# Patient Record
Sex: FEMALE | Race: BLACK OR AFRICAN AMERICAN | NOT HISPANIC OR LATINO | URBAN - METROPOLITAN AREA
[De-identification: names, ages, dates, MRNs, and addresses within clinical notes are randomized per-mention and may not be internally consistent; named-entity substitution may affect disease eponyms.]

---

## 2024-01-23 ENCOUNTER — INPATIENT (INPATIENT)
Facility: HOSPITAL | Age: 69
LOS: 0 days | Discharge: HOME | End: 2024-01-24
Attending: HOSPITALIST | Admitting: HOSPITALIST
Payer: SELF-PAY

## 2024-01-23 VITALS
HEIGHT: 68 IN | DIASTOLIC BLOOD PRESSURE: 85 MMHG | RESPIRATION RATE: 15 BRPM | WEIGHT: 195.11 LBS | HEART RATE: 75 BPM | SYSTOLIC BLOOD PRESSURE: 117 MMHG | OXYGEN SATURATION: 97 % | TEMPERATURE: 98 F

## 2024-01-23 LAB
ALBUMIN SERPL ELPH-MCNC: 3.2 G/DL — LOW (ref 3.3–5)
ALP SERPL-CCNC: 78 U/L — SIGNIFICANT CHANGE UP (ref 40–120)
ALT FLD-CCNC: 19 U/L — SIGNIFICANT CHANGE UP (ref 12–78)
ANION GAP SERPL CALC-SCNC: 3 MMOL/L — LOW (ref 5–17)
AST SERPL-CCNC: 15 U/L — SIGNIFICANT CHANGE UP (ref 15–37)
BASE EXCESS BLDV CALC-SCNC: 6 MMOL/L — HIGH (ref -2–3)
BASOPHILS # BLD AUTO: 0.08 K/UL — SIGNIFICANT CHANGE UP (ref 0–0.2)
BASOPHILS NFR BLD AUTO: 1.1 % — SIGNIFICANT CHANGE UP (ref 0–2)
BILIRUB SERPL-MCNC: 0.3 MG/DL — SIGNIFICANT CHANGE UP (ref 0.2–1.2)
BLOOD GAS COMMENTS, VENOUS: SIGNIFICANT CHANGE UP
BUN SERPL-MCNC: 10 MG/DL — SIGNIFICANT CHANGE UP (ref 7–23)
CALCIUM SERPL-MCNC: 8.8 MG/DL — SIGNIFICANT CHANGE UP (ref 8.5–10.1)
CHLORIDE BLDV-SCNC: 103 MMOL/L — SIGNIFICANT CHANGE UP (ref 98–107)
CHLORIDE SERPL-SCNC: 109 MMOL/L — HIGH (ref 96–108)
CO2 BLDV-SCNC: 34 MMOL/L — HIGH (ref 22–26)
CO2 SERPL-SCNC: 27 MMOL/L — SIGNIFICANT CHANGE UP (ref 22–31)
CREAT SERPL-MCNC: 0.87 MG/DL — SIGNIFICANT CHANGE UP (ref 0.5–1.3)
EGFR: 72 ML/MIN/1.73M2 — SIGNIFICANT CHANGE UP
EOSINOPHIL # BLD AUTO: 0.28 K/UL — SIGNIFICANT CHANGE UP (ref 0–0.5)
EOSINOPHIL NFR BLD AUTO: 3.9 % — SIGNIFICANT CHANGE UP (ref 0–6)
GAS PNL BLDV: 137 MMOL/L — SIGNIFICANT CHANGE UP (ref 136–145)
GAS PNL BLDV: SIGNIFICANT CHANGE UP
GAS PNL BLDV: SIGNIFICANT CHANGE UP
GLUCOSE BLDV-MCNC: 110 MG/DL — HIGH (ref 65–95)
GLUCOSE SERPL-MCNC: 104 MG/DL — HIGH (ref 70–99)
HCO3 BLDV-SCNC: 32 MMOL/L — HIGH (ref 22–28)
HCT VFR BLD CALC: 42.2 % — SIGNIFICANT CHANGE UP (ref 34.5–45)
HCT VFR BLDA CALC: 41 % — SIGNIFICANT CHANGE UP (ref 37–47)
HGB BLD CALC-MCNC: 13.5 G/DL — SIGNIFICANT CHANGE UP (ref 11.7–16.1)
HGB BLD-MCNC: 13.1 G/DL — SIGNIFICANT CHANGE UP (ref 11.5–15.5)
HOROWITZ INDEX BLDV+IHG-RTO: 21 — SIGNIFICANT CHANGE UP
IMM GRANULOCYTES NFR BLD AUTO: 0.1 % — SIGNIFICANT CHANGE UP (ref 0–0.9)
LACTATE BLDV-MCNC: 1 MMOL/L — SIGNIFICANT CHANGE UP (ref 0.56–1.39)
LYMPHOCYTES # BLD AUTO: 3.9 K/UL — HIGH (ref 1–3.3)
LYMPHOCYTES # BLD AUTO: 53.8 % — HIGH (ref 13–44)
MCHC RBC-ENTMCNC: 24 PG — LOW (ref 27–34)
MCHC RBC-ENTMCNC: 31 G/DL — LOW (ref 32–36)
MCV RBC AUTO: 77.3 FL — LOW (ref 80–100)
MONOCYTES # BLD AUTO: 0.55 K/UL — SIGNIFICANT CHANGE UP (ref 0–0.9)
MONOCYTES NFR BLD AUTO: 7.6 % — SIGNIFICANT CHANGE UP (ref 2–14)
NEUTROPHILS # BLD AUTO: 2.43 K/UL — SIGNIFICANT CHANGE UP (ref 1.8–7.4)
NEUTROPHILS NFR BLD AUTO: 33.5 % — LOW (ref 43–77)
NRBC # BLD: 0 /100 WBCS — SIGNIFICANT CHANGE UP (ref 0–0)
NT-PROBNP SERPL-SCNC: 20 PG/ML — SIGNIFICANT CHANGE UP (ref 0–125)
PCO2 BLDV: 52 MMHG — SIGNIFICANT CHANGE UP (ref 42–55)
PH BLDV: 7.4 — SIGNIFICANT CHANGE UP (ref 7.32–7.43)
PLATELET # BLD AUTO: 243 K/UL — SIGNIFICANT CHANGE UP (ref 150–400)
PO2 BLDV: 28 MMHG — SIGNIFICANT CHANGE UP (ref 25–45)
POTASSIUM BLDV-SCNC: 4.4 MMOL/L — SIGNIFICANT CHANGE UP (ref 3.5–5.1)
POTASSIUM SERPL-MCNC: 4.2 MMOL/L — SIGNIFICANT CHANGE UP (ref 3.5–5.3)
POTASSIUM SERPL-SCNC: 4.2 MMOL/L — SIGNIFICANT CHANGE UP (ref 3.5–5.3)
PROT SERPL-MCNC: 7.3 GM/DL — SIGNIFICANT CHANGE UP (ref 6–8.3)
RBC # BLD: 5.46 M/UL — HIGH (ref 3.8–5.2)
RBC # FLD: 15 % — HIGH (ref 10.3–14.5)
SAO2 % BLDV: 35.1 % — LOW (ref 94–98)
SODIUM SERPL-SCNC: 139 MMOL/L — SIGNIFICANT CHANGE UP (ref 135–145)
TROPONIN I, HIGH SENSITIVITY RESULT: 3.8 NG/L — SIGNIFICANT CHANGE UP
TSH SERPL-MCNC: 1.19 UIU/ML — SIGNIFICANT CHANGE UP (ref 0.36–3.74)
WBC # BLD: 7.25 K/UL — SIGNIFICANT CHANGE UP (ref 3.8–10.5)
WBC # FLD AUTO: 7.25 K/UL — SIGNIFICANT CHANGE UP (ref 3.8–10.5)

## 2024-01-23 PROCEDURE — 71045 X-RAY EXAM CHEST 1 VIEW: CPT | Mod: 26

## 2024-01-23 PROCEDURE — 99285 EMERGENCY DEPT VISIT HI MDM: CPT

## 2024-01-23 PROCEDURE — 93010 ELECTROCARDIOGRAM REPORT: CPT

## 2024-01-23 RX ORDER — SODIUM CHLORIDE 9 MG/ML
1000 INJECTION, SOLUTION INTRAVENOUS ONCE
Refills: 0 | Status: COMPLETED | OUTPATIENT
Start: 2024-01-23 | End: 2024-01-23

## 2024-01-23 RX ADMIN — SODIUM CHLORIDE 1000 MILLILITER(S): 9 INJECTION, SOLUTION INTRAVENOUS at 23:39

## 2024-01-23 RX ADMIN — SODIUM CHLORIDE 1000 MILLILITER(S): 9 INJECTION, SOLUTION INTRAVENOUS at 22:28

## 2024-01-23 NOTE — ED ADULT NURSE NOTE - ED STAT RN HANDOFF DETAILS
Report given to COURTNEY sprague on 2d. Rn made aware of pt pmh and hpi, lab and imagign results, IV access, medications given, and POC. VSS, nad noted. Pt A&Ox4.

## 2024-01-23 NOTE — ED PROVIDER NOTE - PHYSICAL EXAMINATION
General: Well appearing, well nourished, in no distress  Head: Normocephalic, atraumatic  Eyes: Conjunctiva clear, sclera non-icteric, EOM intact, PERRL  Mouth: Mucous membranes moist, no mucosal lesions.  Neck: Supple  Heart: Regular rate and rhythm, no murmur or gallop  Lungs: Clear to auscultation  Abdomen: Bowel sounds present, soft, no tenderness, non distended, no organomegaly, masses  Back: Spine normal without deformity or tenderness, no CVA tenderness  Extremities: No amputations or deformities, cyanosis, edema  Musculoskeletal: No crepitation, defects or decreased range of motion, strength intact throughout, pulses intact  Neurologic: No gross deficits CN II-XII intact Sensation intact No dysmetria   Psychiatric: Oriented X3, normal mood and affect  Skin: Warm,dry. Good turgor, no rash, unusual bruising, Cap refill <2 seconds

## 2024-01-23 NOTE — ED ADULT TRIAGE NOTE - CHIEF COMPLAINT QUOTE
witnessed syncope by son in law, states she passed out on the bed, patient complaining of left arm heaviness, right arm pain, dizziness, nausea, headache

## 2024-01-23 NOTE — ED PROVIDER NOTE - CLINICAL SUMMARY MEDICAL DECISION MAKING FREE TEXT BOX
Patient did have recent travel however patient had the symptoms prior to travel.  With her history of hypertension and chest pain we will pursue cardiac workup.  Chest x-ray does not reveal any overt abnormalities.  EKG normal sinus rhythm no QTc prolongation x 2 there is some T wave flattening do not have anything for comparison repeated x 2 with no dynamic change. Patient did have recent travel however patient had these symptoms prior to travel.  With her history of hypertension and chest pain we will pursue cardiac workup.  Chest x-ray does not reveal any overt abnormalities.  EKG normal sinus rhythm no QTc prolongation x 2 there is some T wave flattening do not have anything for comparison repeated x 2 with no dynamic change. Pt states intermittent headache however neuro intact at this time and headache is resolved completely. Will admit for cardiac workup and further evaluation if cardiac workup neg may need further eval inpatient ( neuro) for recurrent syncope.

## 2024-01-23 NOTE — ED PROVIDER NOTE - PROGRESS NOTE DETAILS
trop neg x 2 however with fatigue recurrent syncope will require further cardiac workup TBA trop neg x 2 however with fatigue recurrent syncope will require further cardiac workup TBA  signed out to hosp still pending dimer result  pt states headache is completely resolved at this time.

## 2024-01-23 NOTE — ED ADULT NURSE NOTE - SIGNIFICANT NEGATIVE FINDINGS
denies cp, sob, numbness, tingling, pain, unilateral weakness, cough, sore throat, dysuria, hematuria, n/v/d, HA, dizziness, lightheadedness, palpitations, vision changes, pain

## 2024-01-23 NOTE — ED ADULT NURSE NOTE - NSFALLHARMRISKINTERV_ED_ALL_ED
Assistance OOB with selected safe patient handling equipment if applicable/Communicate risk of Fall with Harm to all staff, patient, and family/Orthostatic vital signs/Provide visual cue: red socks, yellow wristband, yellow gown, etc/Reinforce activity limits and safety measures with patient and family/Bed in lowest position, wheels locked, appropriate side rails in place/Call bell, personal items and telephone in reach/Instruct patient to call for assistance before getting out of bed/chair/stretcher/Non-slip footwear applied when patient is off stretcher/[place] to call system/Physically safe environment - no spills, clutter or unnecessary equipment/Purposeful Proactive Rounding/Room/bathroom lighting operational, light cord in reach

## 2024-01-23 NOTE — ED ADULT NURSE NOTE - OBJECTIVE STATEMENT
Pt A&Ox4 presents to ED for medical eval s/p syncopal episode. As per pt flew into [place] this week and has been feeling tired x 1 week. At around 1800 tonight pt had witnessed syncopal episode at home. As per family, no head strike or LOC, pt passed out on the bed. As per pt after episode pt had nausea, headache, L arm heaviness, and dizziness. Denies all symptoms at this time. Pt denies cp, sob, numbness, tingling, pain, unilateral weakness, cough, sore throat, dysuria, hematuria, n/v/d, HA, f/c, dizziness, lightheadedness, palpitations, vision changes, pain. Pt states "I just feel tired." PMH HTN. NKA. VSS, nad noted. EKG completed, pt placed on cardiac monitor. Pt A&Ox4 presents to ED for medical eval s/p syncopal episode. As per pt flew into [place] this week and has been feeling tired x 1 week. At around 1800 tonight pt had witnessed syncopal episode at home. As per family, no head strike or LOC, pt passed out on the bed. As per pt after episode pt had nausea, headache, L arm heaviness, and dizziness. Denies all symptoms at this time. Pt denies cp, sob, numbness, tingling, pain, unilateral weakness, cough, sore throat, dysuria, hematuria, n/v/d, HA, f/c, dizziness, lightheadedness, palpitations, vision changes, pain. Pt states "I just feel tired." PMH HTN. NKA. VSS, nad noted. EKG completed, pt placed on cardiac monitor. IV Access placed.

## 2024-01-23 NOTE — ED PROVIDER NOTE - OBJECTIVE STATEMENT
This is a 68-year-old female with a past medical history of hypertension.  Patient lives in [place] and is here visiting on vacation with her family.  Patient is a very poor historian.  Patient states she has been having intermittent blurriness and intermittent headaches that are not sudden in onset not the worst headache of her life and a generalized tiredness for the past few weeks.  Of note approximately 2 weeks ago patient was walking when she experienced a syncopal episode.  Patient states she did not follow-up for this.  Since she has been with her family she is very tired she is sleeping a lot and today she was complaining of left chest pain and a left arm heaviness.  No nausea no vomiting no diarrhea no fevers no cough cold symptoms.  Patient states that she has not had a recent stress test or echo patient states that in the past she has a history of pericarditis did not require any intervention.  Daughter at bedside states that she was not herself today and was less chatty but is now at her baseline.  Father had a history of blood clots. This is a 68-year-old female with a past medical history of hypertension.  Patient lives in [place] and is here visiting on vacation with her family.  Patient is a very poor historian.  Patient states she has been having intermittent blurriness and intermittent headaches that are not sudden in onset not the worst headache of her life and a generalized tiredness for the past few weeks. No current headache or vision issues ( wears glasses). Of note approximately 2 weeks ago patient was walking when she experienced a syncopal episode states she was dizzy at that time..  Patient states she did not follow-up for this.  Since she has been with her family she is very tired she is sleeping a lot and today she was complaining of left chest pain and a left arm heaviness. States felt like she was going to pass out then did immediately after. Brief and back to baseline shortly after. No nausea no vomiting no diarrhea no fevers no cough cold symptoms.  Patient states that she has not had a recent stress test or echo patient states that in the past she has a history of pericarditis did not require any intervention.  Daughter at bedside states that she was not herself today and was less chatty but is now at her baseline.  Father had a history of blood clots. This is a 68-year-old female with a past medical history of hypertension.  Patient lives in [place] and is here visiting on vacation with her family.  Patient is a very poor historian.  Patient states she has been having intermittent blurriness and intermittent headaches that are not sudden in onset not the worst headache of her life and a generalized tiredness for the past few weeks. States history of migraines states this headache has been like a cap" around her head not as bad as her migraines. No current headache or vision issues ( wears glasses).  States she did see opth and get new rx however still experiencing intermittent blurriness. Of note approximately 2 weeks ago patient was walking when she experienced a syncopal episode states she was dizzy at that timeStates she has been having L chest pain on and off for quite some time feels it worsens when ambulating.  Patient states she did not follow-up for this.  Since she has been with her family she is very tired she is sleeping a lot and today she was complaining of left chest pain and a left arm heaviness. States felt like she was going to pass out then did immediately after witnessed by son. Brief and back to baseline shortly after. No head trauma. No nausea no vomiting no diarrhea no fevers no cough cold symptoms.  Patient states that she has not had a recent stress test or echo patient states that in the past she has a history of pericarditis with effusion did not require any intervention.  Daughter at bedside states that she was not herself today and was less chatty but is now at her baseline.  Father had a history of blood clots. Brother had cranial surgery from fall/bleed family unable to clarify exact hx.

## 2024-01-24 VITALS
SYSTOLIC BLOOD PRESSURE: 126 MMHG | HEART RATE: 78 BPM | RESPIRATION RATE: 18 BRPM | TEMPERATURE: 99 F | DIASTOLIC BLOOD PRESSURE: 84 MMHG | OXYGEN SATURATION: 96 %

## 2024-01-24 DIAGNOSIS — R55 SYNCOPE AND COLLAPSE: ICD-10-CM

## 2024-01-24 LAB
RAPID RVP RESULT: SIGNIFICANT CHANGE UP
SARS-COV-2 RNA SPEC QL NAA+PROBE: SIGNIFICANT CHANGE UP
TROPONIN I, HIGH SENSITIVITY RESULT: 3.5 NG/L — SIGNIFICANT CHANGE UP

## 2024-01-24 PROCEDURE — 93306 TTE W/DOPPLER COMPLETE: CPT | Mod: 26

## 2024-01-24 PROCEDURE — 99236 HOSP IP/OBS SAME DATE HI 85: CPT

## 2024-01-24 PROCEDURE — 99223 1ST HOSP IP/OBS HIGH 75: CPT

## 2024-01-24 PROCEDURE — 93880 EXTRACRANIAL BILAT STUDY: CPT | Mod: 26

## 2024-01-24 RX ORDER — ACETAMINOPHEN 500 MG
650 TABLET ORAL EVERY 6 HOURS
Refills: 0 | Status: DISCONTINUED | OUTPATIENT
Start: 2024-01-24 | End: 2024-01-24

## 2024-01-24 RX ORDER — ONDANSETRON 8 MG/1
4 TABLET, FILM COATED ORAL EVERY 8 HOURS
Refills: 0 | Status: DISCONTINUED | OUTPATIENT
Start: 2024-01-24 | End: 2024-01-24

## 2024-01-24 RX ORDER — LANOLIN ALCOHOL/MO/W.PET/CERES
3 CREAM (GRAM) TOPICAL AT BEDTIME
Refills: 0 | Status: DISCONTINUED | OUTPATIENT
Start: 2024-01-24 | End: 2024-01-24

## 2024-01-24 RX ORDER — ENOXAPARIN SODIUM 100 MG/ML
40 INJECTION SUBCUTANEOUS EVERY 24 HOURS
Refills: 0 | Status: DISCONTINUED | OUTPATIENT
Start: 2024-01-24 | End: 2024-01-24

## 2024-01-24 NOTE — DISCHARGE NOTE PROVIDER - ATTENDING ATTESTATION STATEMENT
I have personally seen and examined the patient. I have collaborated with and supervised the Billing Type: Third-Party Bill

## 2024-01-24 NOTE — H&P ADULT - PROBLEM SELECTOR PLAN 1
Witnessed syncopal episode   - Tele  - Fall precaution   - Orthostatic vitals  - Neuro-checks Q4hrs   - Echo   - Carotid doppler   - Cardio consult   - DVT Prophylaxis   - F/u labs

## 2024-01-24 NOTE — DISCHARGE NOTE PROVIDER - HOSPITAL COURSE
HPI:  68-year-old female with a PMH of HTN, pericarditis  presents today for witnessed syncopal episode. Prior to episode experienced chest tightness, left arm heaviness with the feeling of "passing out", episode was brief and return to baseline upon waking up. As per daughter, patient appears less interactive today and has been sleeping more. Patient currently Visiting from [place], has been in [place] for 1 week, endorses she has been feeling tired since arrival. 2 weeks ago patient had a syncopal episode, while ambulating, felt dizzy then syncopized. Denies headache, vision changes, chest pain, palpitation, SOB, N/V/D or nay other acute symptoms. Vitals stable.    (24 Jan 2024 05:48)    patient has returned to normal   no acute events on tele   < from: TTE Echo Complete w/o Contrast w/ Doppler (01.24.24 @ 09:28) >    Summary:   1. Normal global left ventricular systolic function.   2. LVEF 55-60%.   3. Trivial pericardial effusion.      [id number] Alistair Kellogg  Electronically signed on 1/24/2024 at 10:55:08 AM    < end of copied text >  Antegrade flow is noted within both vertebral arteries.    IMPRESSION: No significant hemodynamic stenosis of either carotid artery.    Measurement of carotid stenosis is based on velocity parameters that   correlate the residual internal carotid diameter with that of the more   distal vessel in accordance with a method such as the [place] American   Symptomatic Carotid Endarterectomy Trial (NASCET).      patient is from [facility] advised to go home and follow up with her doctors but may return to the ED at anytime

## 2024-01-24 NOTE — H&P ADULT - NSHPLABSRESULTS_GEN_ALL_CORE
13.1   7.25  )-----------( 243      ( 23 Jan 2024 22:24 )             42.2     139  |  109<H>  |  10  ----------------------------<  104<H>     01-23  4.2   |  27  |  0.87    Ca    8.8      23 Jan 2024 22:24    TPro  7.3  /  Alb  3.2<L>  /  TBili  0.3  /  DBili  x   /  AST  15  /  ALT  19  /  AlkPhos  78  01-23        22:30 - VBG - pH: 7.40  | pCO2: 52    | pO2: 28    | Lactate: 1.00       Pro-BNP: 20 (01-23-24 @ 22:24)

## 2024-01-24 NOTE — DISCHARGE NOTE NURSING/CASE MANAGEMENT/SOCIAL WORK - NSDCPEFALRISK_GEN_ALL_CORE
For information on Fall & Injury Prevention, visit: [email or web address] OR  [email or web address] OR  https://www.cdc.gov/steadi/patient.html

## 2024-01-24 NOTE — ED CLERICAL - DIVISION
Patient evaluated after having EGD found with pastor's without dysplasia. Also had Breath test consistent with SIBO.  Today referred having intermittent diarrhea problems.  Denies nausea, vomits, dysphagia, odynophagia, heartburn, abdominal pain, constipation GI bleeding or weight loss [facility]... normal (ped)...

## 2024-01-24 NOTE — DISCHARGE NOTE PROVIDER - ATTENDING DISCHARGE PHYSICAL EXAMINATION:
GENERAL: NAD, well-groomed, well-developed  HEAD:  Atraumatic, Normocephalic  EYES: EOMI, PERRLA, conjunctiva and sclera clear  ENMT: No tonsillar erythema, exudates, or enlargement; Moist mucous membranes, Good dentition, No lesions  NECK: Supple, No JVD, Normal thyroid  NERVOUS SYSTEM:  Alert & Oriented X3, Good concentration; Motor Strength 5/5 B/L upper and lower extremities; DTRs 2+ intact and symmetric  CHEST/LUNG: Clear to percussion bilaterally; No rales, rhonchi, wheezing, or rubs  HEART: Regular rate and rhythm; No murmurs, rubs, or gallops  ABDOMEN: Soft, Nontender, Nondistended; Bowel sounds present  EXTREMITIES:  2+ Peripheral Pulses, No clubbing, cyanosis, or edema  LYMPH: No lymphadenopathy noted

## 2024-01-24 NOTE — DISCHARGE NOTE NURSING/CASE MANAGEMENT/SOCIAL WORK - PATIENT PORTAL LINK FT
You can access the FollowMyHealth Patient Portal offered by [facility] by registering at the following website: [email or web address]. By joining [employer]’s FollowMyHealth portal, you will also be able to view your health information using other applications (apps) compatible with our system.

## 2024-01-24 NOTE — DISCHARGE NOTE PROVIDER - DISCHARGE SERVICE FOR PATIENT
on the discharge service for the patient. I have reviewed and made amendments to the documentation where necessary.

## 2024-01-24 NOTE — DISCHARGE NOTE PROVIDER - TIME SPENT: (MINUTES SPENT ON THE DISCHARGE SERVICE)
Knee pain-chronic  XR in [place]  Ortho referral  Short term NSAID use reviewed, risks benefits    Pt will make appt with [place] doctor or PCP for chronic care follow up  Has sold home and moving to [place] FT soon      Pt agreeable to care plan  
75

## 2024-01-29 DIAGNOSIS — I10 ESSENTIAL (PRIMARY) HYPERTENSION: ICD-10-CM

## 2024-01-29 DIAGNOSIS — I31.9 DISEASE OF PERICARDIUM, UNSPECIFIED: ICD-10-CM

## 2024-01-29 DIAGNOSIS — R55 SYNCOPE AND COLLAPSE: ICD-10-CM

## 2024-02-03 NOTE — ED POST DISCHARGE NOTE - RESULT SUMMARY
Patient called [facility] ED, provided two patient identifiers, inquiring about test results.  Chart review shows that patient received care at [facility] for visit in question.  I instructed patient to call [facility] to discuss results.

## 2024-03-04 NOTE — ED ADULT NURSE NOTE - BREATH SOUNDS, RIGHT
Problem: Patient Centered Care  Goal: Patient preferences are identified and integrated in the patient's plan of care  Description: Interventions:  - What would you like us to know as we care for you?   - Provide timely, complete, and accurate information to patient/family  - Incorporate patient and family knowledge, values, beliefs, and cultural backgrounds into the planning and delivery of care  - Encourage patient/family to participate in care and decision-making at the level they choose  - Honor patient and family perspectives and choices  Outcome: Progressing     Problem: Diabetes/Glucose Control  Goal: Glucose maintained within prescribed range  Description: INTERVENTIONS:  - Monitor Blood Glucose as ordered  - Assess for signs and symptoms of hyperglycemia and hypoglycemia  - Administer ordered medications to maintain glucose within target range  - Assess barriers to adequate nutritional intake and initiate nutrition consult as needed  - Instruct patient on self management of diabetes  Outcome: Progressing     Problem: Patient/Family Goals  Goal: Patient/Family Long Term Goal  Description: Patient's Long Term Goal: Safe & free of falls!    Interventions:  - Fall risk precautions are followed at all times.  - Bed and chair alarm remains in use at all times.  - Call light within reach at all times.   - See additional Care Plan goals for specific interventions  Outcome: Progressing  Goal: Patient/Family Short Term Goal  Description: Patient's Short Term Goal: Pain relief!    Interventions:   - Pain level is assess using pain scale from 1 to 10.  - See additional Care Plan goals for specific interventions  Outcome: Progressing     Problem: PAIN - ADULT  Goal: Verbalizes/displays adequate comfort level or patient's stated pain goal  Description: INTERVENTIONS:  - Encourage pt to monitor pain and request assistance  - Assess pain using appropriate pain scale  - Administer analgesics based on type and severity of  pain and evaluate response  - Implement non-pharmacological measures as appropriate and evaluate response  - Consider cultural and social influences on pain and pain management  - Manage/alleviate anxiety  - Utilize distraction and/or relaxation techniques  - Monitor for opioid side effects  - Notify MD/LIP if interventions unsuccessful or patient reports new pain  - Anticipate increased pain with activity and pre-medicate as appropriate  Outcome: Progressing     Problem: RISK FOR INFECTION - ADULT  Goal: Absence of fever/infection during anticipated neutropenic period  Description: INTERVENTIONS  - Monitor WBC  - Administer growth factors as ordered  - Implement neutropenic guidelines  Outcome: Progressing     Problem: SAFETY ADULT - FALL  Goal: Free from fall injury  Description: INTERVENTIONS:  - Assess pt frequently for physical needs  - Identify cognitive and physical deficits and behaviors that affect risk of falls.  - Goodman fall precautions as indicated by assessment.  - Educate pt/family on patient safety including physical limitations  - Instruct pt to call for assistance with activity based on assessment  - Modify environment to reduce risk of injury  - Provide assistive devices as appropriate  - Consider OT/PT consult to assist with strengthening/mobility  - Encourage toileting schedule  Outcome: Progressing     Problem: METABOLIC/FLUID AND ELECTROLYTES - ADULT  Goal: Glucose maintained within prescribed range  Description: INTERVENTIONS:  - Monitor Blood Glucose as ordered  - Assess for signs and symptoms of hyperglycemia and hypoglycemia  - Administer ordered medications to maintain glucose within target range  - Assess barriers to adequate nutritional intake and initiate nutrition consult as needed  - Instruct patient on self management of diabetes  Outcome: Progressing     Problem: SKIN/TISSUE INTEGRITY - ADULT  Goal: Skin integrity remains intact  Description: INTERVENTIONS  - Assess and document  risk factors for pressure ulcer development  - Assess and document skin integrity  - Monitor for areas of redness and/or skin breakdown  - Initiate interventions, skin care algorithm/standards of care as needed  Outcome: Progressing     Problem: Impaired Functional Mobility  Goal: Achieve highest/safest level of mobility/gait  Description: Interventions:  - Assess patient's functional ability and stability  - Promote increasing activity/tolerance for mobility and gait  - Educate and engage patient/family in tolerated activity level and precautions  - Recommend use of chair position in bed 3 times per day  Outcome: Progressing    Patient is presently sitting up in chair. Alert x 3. Vital signs taken and stable. Fall risk precautions are followed at all times. Tolerates diet. Patient ambulates with standby assist with staff. Intravenous fluids and antibiotics were discontinued. Patient was started on 1800 ADA diet. Labs are monitored. Call light within reach at all times.       clear

## 2025-01-08 ENCOUNTER — INPATIENT (INPATIENT)
Facility: HOSPITAL | Age: 70
LOS: 0 days | Discharge: HOME | End: 2025-01-09
Attending: STUDENT IN AN ORGANIZED HEALTH CARE EDUCATION/TRAINING PROGRAM | Admitting: STUDENT IN AN ORGANIZED HEALTH CARE EDUCATION/TRAINING PROGRAM
Payer: MEDICAID

## 2025-01-08 VITALS — WEIGHT: 188.94 LBS | HEIGHT: 68 IN

## 2025-01-08 DIAGNOSIS — R55 SYNCOPE AND COLLAPSE: ICD-10-CM

## 2025-01-08 DIAGNOSIS — F32.9 MAJOR DEPRESSIVE DISORDER, SINGLE EPISODE, UNSPECIFIED: ICD-10-CM

## 2025-01-08 DIAGNOSIS — I10 ESSENTIAL (PRIMARY) HYPERTENSION: ICD-10-CM

## 2025-01-08 LAB
ALBUMIN SERPL ELPH-MCNC: 3.5 G/DL — SIGNIFICANT CHANGE UP (ref 3.3–5)
ALP SERPL-CCNC: 82 U/L — SIGNIFICANT CHANGE UP (ref 40–120)
ALT FLD-CCNC: 23 U/L — SIGNIFICANT CHANGE UP (ref 12–78)
ANION GAP SERPL CALC-SCNC: 5 MMOL/L — SIGNIFICANT CHANGE UP (ref 5–17)
APPEARANCE UR: CLEAR — SIGNIFICANT CHANGE UP
AST SERPL-CCNC: 23 U/L — SIGNIFICANT CHANGE UP (ref 15–37)
BASOPHILS # BLD AUTO: 0.06 K/UL — SIGNIFICANT CHANGE UP (ref 0–0.2)
BASOPHILS NFR BLD AUTO: 1 % — SIGNIFICANT CHANGE UP (ref 0–2)
BILIRUB SERPL-MCNC: 0.5 MG/DL — SIGNIFICANT CHANGE UP (ref 0.2–1.2)
BILIRUB UR-MCNC: NEGATIVE — SIGNIFICANT CHANGE UP
BUN SERPL-MCNC: 11 MG/DL — SIGNIFICANT CHANGE UP (ref 7–23)
CALCIUM SERPL-MCNC: 9.1 MG/DL — SIGNIFICANT CHANGE UP (ref 8.5–10.1)
CHLORIDE SERPL-SCNC: 107 MMOL/L — SIGNIFICANT CHANGE UP (ref 96–108)
CO2 SERPL-SCNC: 25 MMOL/L — SIGNIFICANT CHANGE UP (ref 22–31)
COLOR SPEC: YELLOW — SIGNIFICANT CHANGE UP
CREAT SERPL-MCNC: 0.86 MG/DL — SIGNIFICANT CHANGE UP (ref 0.5–1.3)
DIFF PNL FLD: NEGATIVE — SIGNIFICANT CHANGE UP
EGFR: 73 ML/MIN/1.73M2 — SIGNIFICANT CHANGE UP
EOSINOPHIL # BLD AUTO: 0.17 K/UL — SIGNIFICANT CHANGE UP (ref 0–0.5)
EOSINOPHIL NFR BLD AUTO: 2.8 % — SIGNIFICANT CHANGE UP (ref 0–6)
GLUCOSE SERPL-MCNC: 88 MG/DL — SIGNIFICANT CHANGE UP (ref 70–99)
GLUCOSE UR QL: NEGATIVE MG/DL — SIGNIFICANT CHANGE UP
HCT VFR BLD CALC: 43.2 % — SIGNIFICANT CHANGE UP (ref 34.5–45)
HGB BLD-MCNC: 13.7 G/DL — SIGNIFICANT CHANGE UP (ref 11.5–15.5)
IMM GRANULOCYTES NFR BLD AUTO: 0.2 % — SIGNIFICANT CHANGE UP (ref 0–0.9)
KETONES UR-MCNC: NEGATIVE MG/DL — SIGNIFICANT CHANGE UP
LEUKOCYTE ESTERASE UR-ACNC: ABNORMAL
LYMPHOCYTES # BLD AUTO: 2.54 K/UL — SIGNIFICANT CHANGE UP (ref 1–3.3)
LYMPHOCYTES # BLD AUTO: 41.9 % — SIGNIFICANT CHANGE UP (ref 13–44)
MCHC RBC-ENTMCNC: 24.2 PG — LOW (ref 27–34)
MCHC RBC-ENTMCNC: 31.7 G/DL — LOW (ref 32–36)
MCV RBC AUTO: 76.3 FL — LOW (ref 80–100)
MONOCYTES # BLD AUTO: 0.41 K/UL — SIGNIFICANT CHANGE UP (ref 0–0.9)
MONOCYTES NFR BLD AUTO: 6.8 % — SIGNIFICANT CHANGE UP (ref 2–14)
NEUTROPHILS # BLD AUTO: 2.87 K/UL — SIGNIFICANT CHANGE UP (ref 1.8–7.4)
NEUTROPHILS NFR BLD AUTO: 47.3 % — SIGNIFICANT CHANGE UP (ref 43–77)
NITRITE UR-MCNC: NEGATIVE — SIGNIFICANT CHANGE UP
NRBC # BLD: 0 /100 WBCS — SIGNIFICANT CHANGE UP (ref 0–0)
PH UR: 6.5 — SIGNIFICANT CHANGE UP (ref 5–8)
PLATELET # BLD AUTO: 190 K/UL — SIGNIFICANT CHANGE UP (ref 150–400)
POTASSIUM SERPL-MCNC: 4.8 MMOL/L — SIGNIFICANT CHANGE UP (ref 3.5–5.3)
POTASSIUM SERPL-SCNC: 4.8 MMOL/L — SIGNIFICANT CHANGE UP (ref 3.5–5.3)
PROT SERPL-MCNC: 7.6 GM/DL — SIGNIFICANT CHANGE UP (ref 6–8.3)
PROT UR-MCNC: NEGATIVE MG/DL — SIGNIFICANT CHANGE UP
RBC # BLD: 5.66 M/UL — HIGH (ref 3.8–5.2)
RBC # FLD: 15.6 % — HIGH (ref 10.3–14.5)
SODIUM SERPL-SCNC: 137 MMOL/L — SIGNIFICANT CHANGE UP (ref 135–145)
SP GR SPEC: 1.01 — SIGNIFICANT CHANGE UP (ref 1–1.03)
TROPONIN I, HIGH SENSITIVITY RESULT: 5.2 NG/L — SIGNIFICANT CHANGE UP
UROBILINOGEN FLD QL: 0.2 MG/DL — SIGNIFICANT CHANGE UP (ref 0.2–1)
WBC # BLD: 6.06 K/UL — SIGNIFICANT CHANGE UP (ref 3.8–10.5)
WBC # FLD AUTO: 6.06 K/UL — SIGNIFICANT CHANGE UP (ref 3.8–10.5)

## 2025-01-08 PROCEDURE — 99285 EMERGENCY DEPT VISIT HI MDM: CPT

## 2025-01-08 PROCEDURE — 70450 CT HEAD/BRAIN W/O DYE: CPT | Mod: 26

## 2025-01-08 PROCEDURE — 99222 1ST HOSP IP/OBS MODERATE 55: CPT

## 2025-01-08 PROCEDURE — 93010 ELECTROCARDIOGRAM REPORT: CPT

## 2025-01-08 PROCEDURE — 71045 X-RAY EXAM CHEST 1 VIEW: CPT | Mod: 26

## 2025-01-08 PROCEDURE — 70552 MRI BRAIN STEM W/DYE: CPT | Mod: 26

## 2025-01-08 RX ORDER — GINKGO BILOBA 40 MG
3 CAPSULE ORAL AT BEDTIME
Refills: 0 | Status: DISCONTINUED | OUTPATIENT
Start: 2025-01-08 | End: 2025-01-09

## 2025-01-08 RX ORDER — SODIUM CHLORIDE 9 MG/ML
1000 INJECTION, SOLUTION INTRAVENOUS
Refills: 0 | Status: DISCONTINUED | OUTPATIENT
Start: 2025-01-08 | End: 2025-01-09

## 2025-01-08 RX ORDER — ESCITALOPRAM OXALATE 10 MG/1
20 TABLET ORAL DAILY
Refills: 0 | Status: DISCONTINUED | OUTPATIENT
Start: 2025-01-08 | End: 2025-01-09

## 2025-01-08 RX ORDER — ACETAMINOPHEN 80 MG/.8ML
650 SOLUTION/ DROPS ORAL EVERY 6 HOURS
Refills: 0 | Status: DISCONTINUED | OUTPATIENT
Start: 2025-01-08 | End: 2025-01-09

## 2025-01-08 RX ORDER — CEFTRIAXONE SODIUM 1 G/1
1000 INJECTION, POWDER, FOR SOLUTION INTRAMUSCULAR; INTRAVENOUS ONCE
Refills: 0 | Status: COMPLETED | OUTPATIENT
Start: 2025-01-08 | End: 2025-01-08

## 2025-01-08 RX ADMIN — SODIUM CHLORIDE 125 MILLILITER(S): 9 INJECTION, SOLUTION INTRAVENOUS at 21:23

## 2025-01-08 RX ADMIN — CEFTRIAXONE SODIUM 100 MILLIGRAM(S): 1 INJECTION, POWDER, FOR SOLUTION INTRAMUSCULAR; INTRAVENOUS at 14:56

## 2025-01-08 NOTE — H&P ADULT - HISTORY OF PRESENT ILLNESS
69 years old female with h/o HTN, depression present to ED with syncope. Patient reported intermittent dizziness, unsteady gait for > one month. Dizziness is both room spinning and lightheadedness. Patient had 3 episode of syncope within last 3 weeks. Reported decrease appetite, generalized weakness and unintential weight loss. No fever or chills. No chest pain on exertion.   Hemodynamically stable, afebrile, sat well at RA. No leukocytosis, plt 190, Cr 0.86, hsTnT 5.2. CT head with no acute pathology

## 2025-01-08 NOTE — ED PROVIDER NOTE - NS ED ROS FT
CONSTITUTIONAL: Positive for weakness.  No fevers or chills.  Positive for dizziness    HEENT:    Eyes: No visual loss, double vision or yellow sclerae.  Positive for blurry vision.  Ears, Nose, Throat: No hearing loss, sneezing, congestion, runny nose or sore throat.    CARDIOVASCULAR: No chest pain, chest pressure or chest discomfort. No palpitations or edema.    RESPIRATORY: No shortness of breath, cough or sputum.    GASTROINTESTINAL: No anorexia, nausea, vomiting or diarrhea. No abdominal pain or blood.    SKIN: No rash or itching.    GENITOURINARY: Patient denies any changes to bowel or bladder function.    NEUROLOGICAL: No headache, paralysis, ataxia. No change in bowel or bladder control.  Positive for dizziness and syncope.  Positive for numbness and tingling in the left upper extremity that has since improved.    MUSCULOSKELETAL: No muscle, back pain, joint pain or stiffness.    .

## 2025-01-08 NOTE — H&P ADULT - PROBLEM SELECTOR PLAN 1
present with 3 syncope within 3 weeks  h/o admission for syncope in 01/2024 with syncope. ECHO normal and carotid doppler with no hemodynamic stenosis  Reported intermittent dizziness for > 1 month with unsteady gait during episode of dizziness. Decrease oral intake/appetite and weight loss  Check orthostatic vital signs  Check EKG ( pending), telemetry monitoring  trial of IV fluid  MRI head to evaluate for intracranial pathology/tumor  Patient is visiting here from [place] with plan to return soon. Will benefit from outpatient cardiology follow [place]

## 2025-01-08 NOTE — PATIENT PROFILE ADULT - FALL HARM RISK - HARM RISK INTERVENTIONS
Communicate Risk of Fall with Harm to all staff/Monitor gait and stability/Reinforce activity limits and safety measures with patient and family/Sit up slowly, dangle for a short time, stand at bedside before walking/Tailored Fall Risk Interventions/Use of alarms - bed, chair and/or voice tab/Visual Cue: Yellow wristband and red socks/Bed in lowest position, wheels locked, appropriate side rails in place/Call bell, personal items and telephone in reach/Instruct patient to call for assistance before getting out of bed or chair/Non-slip footwear when patient is out of bed/[place] to call system/Physically safe environment - no spills, clutter or unnecessary equipment/Purposeful Proactive Rounding/Room/bathroom lighting operational, light cord in reach

## 2025-01-08 NOTE — ED PROVIDER NOTE - OBJECTIVE STATEMENT
This patient is a 69-year-old female presenting to the emergency department today for dizziness and syncopal episodes.  He has a past medical history of hypertension.  She has a past medical history of depression and "fluid around the heart."  She states that she took medication and that improved with the fluid.    She states that she has been visiting [place] for several months.  For the last 2 months she has been experiencing intermittent episodes of dizziness.  3 episodes of syncopal episode associated with the dizziness.  She states that when she has this dizziness she also experiences blurred vision.  She is also experiencing weakness.  Yesterday she experienced some left arm numbness and tingling, however all of her symptoms have resolved.  She states that at this time she has no dizziness, syncopal episodes today, or numbness or tingling in her left upper extremity.  She has no other complaints at this time.  No chest pain or shortness of breath.  No fevers or chills.  No nausea or vomiting.  No constipation or diarrhea.  The dizziness is not positional.  She is able to sit up in the bed without episodes of dizziness.

## 2025-01-08 NOTE — H&P ADULT - NSHPPHYSICALEXAM_GEN_ALL_CORE
CONSTITUTIONAL: alert and cooperative, no acute distress.  EYES: PERRL,  no scleral icterus  ENT: Mucosa moist, tongue normal.  NECK: Neck supple, trachea midline, non-tender  CARDIAC: Normal S1 and S2. Regular rate and rhythms. No Pedal edema.  LUNGS: Equal air entry both lungs. No rales, rhonchi, wheezing. Normal respiratory effort.   ABDOMEN: Soft, nondistended, nontender. No guarding or rebound tenderness. No hepatomegaly or splenomegaly. Bowel sound normal.   MUSCULOSKELETAL: Normocephalic, atraumatic. No significant deformity or joint abnormality.  NEUROLOGICAL: No gross motor or sensory deficits  SKIN: no lesions or eruptions. Normal turgor  PSYCHIATRIC: A&O x 3, appropriate mood and affect.

## 2025-01-08 NOTE — ED PROVIDER NOTE - CLINICAL SUMMARY MEDICAL DECISION MAKING FREE TEXT BOX
Patient is a 69-year-old female presenting to the emergency department today for dizziness and syncopal episodes.  CBC shows no leukocytosis.  No acute anemia.  No thrombocytopenia.  No electrolyte abnormalities.  No TANYA.  Elevated LFTs.  Troponin is negative.  EKG shows no acute ischemic changes.  No acute arrhythmias.  Patient is not complaining of chest pain.  Urinalysis shows possible urinary tract infection.  She was empirically covered.  CT of the head shows no acute anemia.  No evidence of acute intracranial abnormalities.    Chest x-ray shows no pneumonia no pneumothoraces per my independent evaluation.    As the patient has had multiple episodes of dizziness and vision changes associated with syncopal episodes, we believe that she would benefit from admission to the hospital for further workup and management.  I discussed this plan with the hospitalist service who went to evaluate the patient.  Patient and the hospitalist agreed that the patient would benefit from admission to the hospital at this time.  Her current plan is for admission under the hospitalist service.

## 2025-01-08 NOTE — ED ADULT NURSE NOTE - OBJECTIVE STATEMENT
69yF A&Ox3 presenting to ED with R arm pain and dizziness. pt rpeorts the R arm pain started about 5 days ago and she has been having frequent dizziness for the past 2 months. pt denies current dizziness. pt reports she experienced syncopal episodes 2X within the past week where she had + LOC but is unsure for how long. pt denies chest pain, sob, blurred vision, N+T, increased work of breathing, abd pain, N+V+D, back pain, h/a, recent fever/cough,  symptoms. pt is ambulatory with steady gait at this time. HX HTN.

## 2025-01-08 NOTE — ED ADULT TRIAGE NOTE - CHIEF COMPLAINT QUOTE
Came in for right arm pain started 5 days ago and constant dizziness for the past 2 months. No dizziness reported at this time. Daughter states she is concerned patient is having a stroke. Ambulatory with steady gait. PMH HTN

## 2025-01-08 NOTE — ED ADULT NURSE REASSESSMENT NOTE - NS ED NURSE REASSESS COMMENT FT1
Pt requested to speak with Provider regarding his POC. RN notified Provider. provider came to pt bedside and updated POC as well as diet orders at pts request. pt was moved from [address] to M38[address] for comfort. pt then repeatedly called for RN to provide addl meal trays which were provided. pt then called for urinal which was at bedside. RN assisted pt to appropriate position to urinate and provided privacy via curtain. orders reviewed and addressed. safety maintained.

## 2025-01-08 NOTE — H&P ADULT - NSVTERISKREFERASSESS_GEN_ALL_CORE
Refer to the Assessment tab to view/cancel completed assessment. [place] Counseling: I discussed with the patient the risks of ixekizumab including but not limited to immunosuppression, serious infections, worsening of inflammatory bowel disease and drug reactions.  The patient understands that monitoring is required including a PPD at baseline and must alert us or the primary physician if symptoms of infection or other concerning signs are noted.

## 2025-01-08 NOTE — H&P ADULT - NSHPLABSRESULTS_GEN_ALL_CORE
ECHO 01/2024  1. Normal global left ventricular systolic function.   2. LVEF 55-60%.   3. Trivial pericardial effusion.      Carotid doppler 1/2024-No significant hemodynamic stenosis of either carotid artery

## 2025-01-09 VITALS
RESPIRATION RATE: 18 BRPM | HEART RATE: 64 BPM | DIASTOLIC BLOOD PRESSURE: 76 MMHG | TEMPERATURE: 98 F | OXYGEN SATURATION: 99 % | SYSTOLIC BLOOD PRESSURE: 119 MMHG

## 2025-01-09 LAB
A1C WITH ESTIMATED AVERAGE GLUCOSE RESULT: 6.1 % — HIGH (ref 4–5.6)
ALBUMIN SERPL ELPH-MCNC: 3.1 G/DL — LOW (ref 3.3–5)
ALP SERPL-CCNC: 74 U/L — SIGNIFICANT CHANGE UP (ref 40–120)
ALT FLD-CCNC: 20 U/L — SIGNIFICANT CHANGE UP (ref 12–78)
ANION GAP SERPL CALC-SCNC: 8 MMOL/L — SIGNIFICANT CHANGE UP (ref 5–17)
AST SERPL-CCNC: 12 U/L — LOW (ref 15–37)
BILIRUB SERPL-MCNC: 0.3 MG/DL — SIGNIFICANT CHANGE UP (ref 0.2–1.2)
BUN SERPL-MCNC: 12 MG/DL — SIGNIFICANT CHANGE UP (ref 7–23)
CALCIUM SERPL-MCNC: 8.5 MG/DL — SIGNIFICANT CHANGE UP (ref 8.5–10.1)
CHLORIDE SERPL-SCNC: 108 MMOL/L — SIGNIFICANT CHANGE UP (ref 96–108)
CHOLEST SERPL-MCNC: 271 MG/DL — HIGH
CO2 SERPL-SCNC: 25 MMOL/L — SIGNIFICANT CHANGE UP (ref 22–31)
CREAT SERPL-MCNC: 0.7 MG/DL — SIGNIFICANT CHANGE UP (ref 0.5–1.3)
EGFR: 93 ML/MIN/1.73M2 — SIGNIFICANT CHANGE UP
ESTIMATED AVERAGE GLUCOSE: 128 MG/DL — HIGH (ref 68–114)
GLUCOSE SERPL-MCNC: 94 MG/DL — SIGNIFICANT CHANGE UP (ref 70–99)
HCT VFR BLD CALC: 38 % — SIGNIFICANT CHANGE UP (ref 34.5–45)
HDLC SERPL-MCNC: 51 MG/DL — SIGNIFICANT CHANGE UP
HGB BLD-MCNC: 12.2 G/DL — SIGNIFICANT CHANGE UP (ref 11.5–15.5)
LIPID PNL WITH DIRECT LDL SERPL: 192 MG/DL — HIGH
MAGNESIUM SERPL-MCNC: 2.1 MG/DL — SIGNIFICANT CHANGE UP (ref 1.6–2.6)
MCHC RBC-ENTMCNC: 24.1 PG — LOW (ref 27–34)
MCHC RBC-ENTMCNC: 32.1 G/DL — SIGNIFICANT CHANGE UP (ref 32–36)
MCV RBC AUTO: 75.1 FL — LOW (ref 80–100)
NON HDL CHOLESTEROL: 220 MG/DL — HIGH
NRBC # BLD: 0 /100 WBCS — SIGNIFICANT CHANGE UP (ref 0–0)
PHOSPHATE SERPL-MCNC: 3.7 MG/DL — SIGNIFICANT CHANGE UP (ref 2.5–4.5)
PLATELET # BLD AUTO: 231 K/UL — SIGNIFICANT CHANGE UP (ref 150–400)
POTASSIUM SERPL-MCNC: 3.7 MMOL/L — SIGNIFICANT CHANGE UP (ref 3.5–5.3)
POTASSIUM SERPL-SCNC: 3.7 MMOL/L — SIGNIFICANT CHANGE UP (ref 3.5–5.3)
PROT SERPL-MCNC: 6.5 GM/DL — SIGNIFICANT CHANGE UP (ref 6–8.3)
RBC # BLD: 5.06 M/UL — SIGNIFICANT CHANGE UP (ref 3.8–5.2)
RBC # FLD: 15.6 % — HIGH (ref 10.3–14.5)
SODIUM SERPL-SCNC: 141 MMOL/L — SIGNIFICANT CHANGE UP (ref 135–145)
TRIGL SERPL-MCNC: 152 MG/DL — HIGH
WBC # BLD: 6.77 K/UL — SIGNIFICANT CHANGE UP (ref 3.8–10.5)
WBC # FLD AUTO: 6.77 K/UL — SIGNIFICANT CHANGE UP (ref 3.8–10.5)

## 2025-01-09 PROCEDURE — 99239 HOSP IP/OBS DSCHRG MGMT >30: CPT

## 2025-01-09 RX ORDER — MECLIZINE HYDROCHLORIDE 25 MG/1
12.5 TABLET ORAL EVERY 6 HOURS
Refills: 0 | Status: DISCONTINUED | OUTPATIENT
Start: 2025-01-09 | End: 2025-01-09

## 2025-01-09 RX ORDER — FLUTICASONE PROPIONATE 50 UG/1
1 SPRAY, METERED NASAL
Qty: 1 | Refills: 0
Start: 2025-01-09

## 2025-01-09 RX ORDER — MECLIZINE HYDROCHLORIDE 25 MG/1
1 TABLET ORAL
Qty: 15 | Refills: 0
Start: 2025-01-09

## 2025-01-09 RX ORDER — FLUTICASONE PROPIONATE 50 UG/1
1 SPRAY, METERED NASAL
Refills: 0 | Status: DISCONTINUED | OUTPATIENT
Start: 2025-01-09 | End: 2025-01-09

## 2025-01-09 RX ORDER — ESCITALOPRAM OXALATE 10 MG/1
1 TABLET ORAL
Refills: 0 | DISCHARGE

## 2025-01-09 RX ORDER — ESCITALOPRAM OXALATE 10 MG/1
1 TABLET ORAL
Qty: 0 | Refills: 0 | DISCHARGE
Start: 2025-01-09

## 2025-01-09 RX ADMIN — SODIUM CHLORIDE 125 MILLILITER(S): 9 INJECTION, SOLUTION INTRAVENOUS at 11:16

## 2025-01-09 RX ADMIN — SODIUM CHLORIDE 125 MILLILITER(S): 9 INJECTION, SOLUTION INTRAVENOUS at 05:37

## 2025-01-09 RX ADMIN — ESCITALOPRAM OXALATE 20 MILLIGRAM(S): 10 TABLET ORAL at 11:16

## 2025-01-09 RX ADMIN — FLUTICASONE PROPIONATE 1 SPRAY(S): 50 SPRAY, METERED NASAL at 11:16

## 2025-01-09 NOTE — DISCHARGE NOTE NURSING/CASE MANAGEMENT/SOCIAL WORK - NSDCPEFALRISK_GEN_ALL_CORE
For information on Fall & Injury Prevention, visit: [email or web address] OR  [email or web address] OR  https://www.cdc.gov/steadi/patient.html

## 2025-01-09 NOTE — DISCHARGE NOTE NURSING/CASE MANAGEMENT/SOCIAL WORK - FINANCIAL ASSISTANCE
[facility] provides services at a reduced cost to those who are determined to be eligible through [facility]’s financial assistance program. Information regarding [facility]’s financial assistance program can be found by going to [email or web address] or by calling [phone].

## 2025-01-09 NOTE — DISCHARGE NOTE PROVIDER - HOSPITAL COURSE
This 69-year-old female with a history of hypertension and depression presented to the ED with syncope. She reported intermittent dizziness (both lightheadedness and vertigo) and unsteady gait for over a month, with three syncopal episodes in the last three weeks. She also endorsed decreased appetite, generalized weakness, and unintentional weight loss. She denied fever, chills, or exertional chest pain. On exam, she was hemodynamically stable, afebrile, and saturating well on room air. Labs were notable for a platelet count of 190, creatinine of 0.86, and a troponin of 5.2. Head CT showed no acute pathology. MRI revealed mild to moderate nonspecific white matter changes and chronic sinusitis but no acute ischemia or metastatic disease. EKG and telemetry monitoring were performed. The patient was treated with IV fluids and meclizine for vertigo. Fluticasone was started for sinusitis. Amlodipine was held. She will follow up with cardiology in her home country of [facility].

## 2025-01-09 NOTE — DISCHARGE NOTE NURSING/CASE MANAGEMENT/SOCIAL WORK - PATIENT PORTAL LINK FT
You can access the FollowMyHealth Patient Portal offered by [facility] by registering at the following website: [email or web address]. By joining [facility]’s FollowMyHealth portal, you will also be able to view your health information using other applications (apps) compatible with our system.

## 2025-01-13 DIAGNOSIS — R63.0 ANOREXIA: ICD-10-CM

## 2025-01-13 DIAGNOSIS — F32.9 MAJOR DEPRESSIVE DISORDER, SINGLE EPISODE, UNSPECIFIED: ICD-10-CM

## 2025-01-13 DIAGNOSIS — J32.9 CHRONIC SINUSITIS, UNSPECIFIED: ICD-10-CM

## 2025-01-13 DIAGNOSIS — R63.4 ABNORMAL WEIGHT LOSS: ICD-10-CM

## 2025-01-13 DIAGNOSIS — Z79.899 OTHER LONG TERM (CURRENT) DRUG THERAPY: ICD-10-CM

## 2025-01-13 DIAGNOSIS — R53.1 WEAKNESS: ICD-10-CM

## 2025-01-13 DIAGNOSIS — R55 SYNCOPE AND COLLAPSE: ICD-10-CM

## 2025-01-13 DIAGNOSIS — R26.81 UNSTEADINESS ON FEET: ICD-10-CM

## 2025-01-13 DIAGNOSIS — I10 ESSENTIAL (PRIMARY) HYPERTENSION: ICD-10-CM

## 2025-01-13 DIAGNOSIS — Z88.5 ALLERGY STATUS TO NARCOTIC AGENT: ICD-10-CM

## 2025-01-13 DIAGNOSIS — R90.82 WHITE MATTER DISEASE, UNSPECIFIED: ICD-10-CM

## 2025-03-20 NOTE — H&P ADULT - HISTORY OF PRESENT ILLNESS
Physical Therapy   Date: 3/20/2025  Patient did not attend nor call to cancel  today's (3/20/2025) scheduled appointment.  This is the patient's second no show/late cancel.      Voice message was left indicating:, - missed appointment, - date of next scheduled appointment , - attendance guidelines review, - one future appointment may be scheduled at a time if they would like to continue with therapy  
68-year-old female with a PMH of HTN, pericarditis  presents today for witnessed syncopal episode. Prior to episode experienced chest tightness, left arm heaviness with the feeling of "passing out", episode was brief and return to baseline upon waking up. As per daughter, patient appears less interactive today and has been sleeping more. Patient currently Visiting from [place], has been in [place] for 1 week, endorses she has been feeling tired since arrival. 2 weeks ago patient had a syncopal episode, while ambulating, felt dizzy then syncopized. Denies headache, vision changes, chest pain, palpitation, SOB, N/V/D or nay other acute symptoms. Vitals stable.
